# Patient Record
Sex: FEMALE | Race: WHITE | Employment: FULL TIME | URBAN - METROPOLITAN AREA
[De-identification: names, ages, dates, MRNs, and addresses within clinical notes are randomized per-mention and may not be internally consistent; named-entity substitution may affect disease eponyms.]

---

## 2017-01-10 RX ORDER — ATORVASTATIN CALCIUM 80 MG/1
80 TABLET, FILM COATED ORAL NIGHTLY
Qty: 90 TABLET | Refills: 1 | Status: SHIPPED | OUTPATIENT
Start: 2017-01-10 | End: 2017-03-29 | Stop reason: SDUPTHER

## 2017-01-10 RX ORDER — CLOPIDOGREL BISULFATE 75 MG/1
75 TABLET ORAL DAILY
Qty: 90 TABLET | Refills: 1 | Status: SHIPPED | OUTPATIENT
Start: 2017-01-10 | End: 2017-02-15 | Stop reason: SDUPTHER

## 2017-01-11 ENCOUNTER — TELEPHONE (OUTPATIENT)
Dept: CARDIOLOGY CLINIC | Age: 62
End: 2017-01-11

## 2017-01-24 ENCOUNTER — OFFICE VISIT (OUTPATIENT)
Dept: CARDIOLOGY CLINIC | Age: 62
End: 2017-01-24

## 2017-01-24 VITALS
SYSTOLIC BLOOD PRESSURE: 140 MMHG | DIASTOLIC BLOOD PRESSURE: 70 MMHG | WEIGHT: 177 LBS | HEART RATE: 49 BPM | HEIGHT: 64 IN | BODY MASS INDEX: 30.22 KG/M2 | OXYGEN SATURATION: 97 %

## 2017-01-24 DIAGNOSIS — Z86.711 HISTORY OF PULMONARY EMBOLISM: ICD-10-CM

## 2017-01-24 DIAGNOSIS — E78.2 MIXED HYPERLIPIDEMIA: ICD-10-CM

## 2017-01-24 DIAGNOSIS — I42.9 CARDIOMYOPATHY (HCC): ICD-10-CM

## 2017-01-24 DIAGNOSIS — R06.02 SOB (SHORTNESS OF BREATH): ICD-10-CM

## 2017-01-24 DIAGNOSIS — I25.10 CORONARY ARTERY DISEASE INVOLVING NATIVE CORONARY ARTERY OF NATIVE HEART WITHOUT ANGINA PECTORIS: Primary | ICD-10-CM

## 2017-01-24 DIAGNOSIS — I10 ESSENTIAL HYPERTENSION: ICD-10-CM

## 2017-01-24 DIAGNOSIS — I21.4 NSTEMI (NON-ST ELEVATED MYOCARDIAL INFARCTION) (HCC): ICD-10-CM

## 2017-01-24 PROCEDURE — 99214 OFFICE O/P EST MOD 30 MIN: CPT | Performed by: INTERNAL MEDICINE

## 2017-01-25 ENCOUNTER — HOSPITAL ENCOUNTER (OUTPATIENT)
Dept: OTHER | Age: 62
Discharge: HOME | End: 2017-01-25
Attending: INTERNAL MEDICINE | Admitting: INTERNAL MEDICINE

## 2017-01-25 LAB
CHOLESTEROL, TOTAL: 159 MG/DL (ref 0–199)
HDLC SERPL-MCNC: 78 MG/DL (ref 40–60)
LDL CHOLESTEROL CALCULATED: 57 MG/DL
TRIGL SERPL-MCNC: 120 MG/DL (ref 0–150)
VLDLC SERPL CALC-MCNC: 24 MG/DL

## 2017-01-26 ENCOUNTER — TELEPHONE (OUTPATIENT)
Dept: CARDIOLOGY CLINIC | Age: 62
End: 2017-01-26

## 2017-02-15 RX ORDER — CLOPIDOGREL BISULFATE 75 MG/1
75 TABLET ORAL DAILY
Qty: 90 TABLET | Refills: 3 | Status: SHIPPED | OUTPATIENT
Start: 2017-02-15 | End: 2017-12-05 | Stop reason: ALTCHOICE

## 2017-02-28 ENCOUNTER — TELEPHONE (OUTPATIENT)
Dept: CARDIOLOGY CLINIC | Age: 62
End: 2017-02-28

## 2017-02-28 ENCOUNTER — HOSPITAL ENCOUNTER (OUTPATIENT)
Dept: CARDIOLOGY | Facility: CLINIC | Age: 62
Discharge: HOME | End: 2017-02-28
Attending: INTERNAL MEDICINE | Admitting: INTERNAL MEDICINE

## 2017-03-02 PROBLEM — I50.20 SYSTOLIC CHF (HCC): Status: ACTIVE | Noted: 2017-03-02

## 2017-03-02 PROBLEM — R74.8 ELEVATED LIVER ENZYMES: Status: ACTIVE | Noted: 2017-03-02

## 2017-03-02 PROBLEM — R07.9 CHEST PAIN: Status: ACTIVE | Noted: 2017-03-02

## 2017-03-06 ENCOUNTER — TELEPHONE (OUTPATIENT)
Dept: CARDIOLOGY CLINIC | Age: 62
End: 2017-03-06

## 2017-03-06 DIAGNOSIS — E78.2 MIXED HYPERLIPIDEMIA: ICD-10-CM

## 2017-03-06 DIAGNOSIS — I25.10 CORONARY ARTERY DISEASE INVOLVING NATIVE CORONARY ARTERY OF NATIVE HEART WITHOUT ANGINA PECTORIS: Primary | ICD-10-CM

## 2017-03-06 DIAGNOSIS — I10 ESSENTIAL HYPERTENSION: ICD-10-CM

## 2017-03-06 DIAGNOSIS — I21.4 NSTEMI (NON-ST ELEVATED MYOCARDIAL INFARCTION) (HCC): ICD-10-CM

## 2017-03-08 RX ORDER — RANOLAZINE 500 MG/1
500 TABLET, EXTENDED RELEASE ORAL 2 TIMES DAILY
Qty: 180 TABLET | Refills: 3 | Status: SHIPPED | OUTPATIENT
Start: 2017-03-08 | End: 2018-01-22 | Stop reason: SDUPTHER

## 2017-03-14 ENCOUNTER — OFFICE VISIT (OUTPATIENT)
Dept: CARDIOLOGY CLINIC | Age: 62
End: 2017-03-14

## 2017-03-14 VITALS
WEIGHT: 174 LBS | BODY MASS INDEX: 30.83 KG/M2 | OXYGEN SATURATION: 97 % | HEIGHT: 63 IN | SYSTOLIC BLOOD PRESSURE: 138 MMHG | HEART RATE: 53 BPM | DIASTOLIC BLOOD PRESSURE: 70 MMHG

## 2017-03-14 DIAGNOSIS — E78.2 MIXED HYPERLIPIDEMIA: ICD-10-CM

## 2017-03-14 DIAGNOSIS — I25.10 CORONARY ARTERY DISEASE INVOLVING NATIVE CORONARY ARTERY OF NATIVE HEART WITHOUT ANGINA PECTORIS: Primary | ICD-10-CM

## 2017-03-14 DIAGNOSIS — I21.4 NSTEMI (NON-ST ELEVATED MYOCARDIAL INFARCTION) (HCC): ICD-10-CM

## 2017-03-14 DIAGNOSIS — I42.9 CARDIOMYOPATHY (HCC): ICD-10-CM

## 2017-03-14 DIAGNOSIS — I10 ESSENTIAL HYPERTENSION: ICD-10-CM

## 2017-03-14 PROCEDURE — 99214 OFFICE O/P EST MOD 30 MIN: CPT | Performed by: NURSE PRACTITIONER

## 2017-03-20 ENCOUNTER — TELEPHONE (OUTPATIENT)
Dept: CARDIOLOGY CLINIC | Age: 62
End: 2017-03-20

## 2017-03-29 RX ORDER — ATORVASTATIN CALCIUM 80 MG/1
TABLET, FILM COATED ORAL
Qty: 90 TABLET | Refills: 0 | Status: SHIPPED | OUTPATIENT
Start: 2017-03-29 | End: 2017-05-31 | Stop reason: SDUPTHER

## 2017-04-28 ENCOUNTER — HOSPITAL ENCOUNTER (OUTPATIENT)
Dept: OTHER | Age: 62
Discharge: HOME | End: 2017-04-28

## 2017-04-28 LAB
HCT VFR BLD CALC: 32.7 % (ref 36–48)
HEMOGLOBIN: 10.9 G/DL (ref 12–16)

## 2017-05-31 RX ORDER — ATORVASTATIN CALCIUM 80 MG/1
TABLET, FILM COATED ORAL
Qty: 90 TABLET | Refills: 1 | Status: SHIPPED | OUTPATIENT
Start: 2017-05-31 | End: 2018-04-21 | Stop reason: SDUPTHER

## 2017-06-02 LAB
BLOOD BANK COMMENT: NORMAL
SPECIMEN EXPIRATION: NORMAL

## 2017-06-03 LAB
ABO GROUPING: NORMAL
ANTIBODY SCREEN: NEGATIVE
BLOOD PRODUCT UNIT ID DOSE NUM: NORMAL
BLOOD PRODUCT UNIT ID DOSE NUM: NORMAL
COMPONENT: NORMAL
COMPONENT: NORMAL
CROSSMATCH RESULT: NORMAL
CROSSMATCH RESULT: NORMAL
SPECIMEN EXPIRATION: NORMAL
STATUS: NORMAL
STATUS: NORMAL
TRANSFUSION STATUS FIND PT: NORMAL
TRANSFUSION STATUS FIND PT: NORMAL
UNIT DIVISION: 0
UNIT DIVISION: 0

## 2017-06-27 ENCOUNTER — OFFICE VISIT (OUTPATIENT)
Dept: CARDIOLOGY CLINIC | Age: 62
End: 2017-06-27

## 2017-06-27 VITALS
HEIGHT: 63 IN | SYSTOLIC BLOOD PRESSURE: 82 MMHG | HEART RATE: 88 BPM | WEIGHT: 151 LBS | DIASTOLIC BLOOD PRESSURE: 56 MMHG | BODY MASS INDEX: 26.75 KG/M2

## 2017-06-27 DIAGNOSIS — I50.21 ACUTE SYSTOLIC CONGESTIVE HEART FAILURE (HCC): ICD-10-CM

## 2017-06-27 DIAGNOSIS — Z86.711 HISTORY OF PULMONARY EMBOLISM: ICD-10-CM

## 2017-06-27 DIAGNOSIS — E78.00 PURE HYPERCHOLESTEROLEMIA: ICD-10-CM

## 2017-06-27 DIAGNOSIS — I25.10 CORONARY ARTERY DISEASE INVOLVING NATIVE CORONARY ARTERY OF NATIVE HEART WITHOUT ANGINA PECTORIS: Primary | ICD-10-CM

## 2017-06-27 DIAGNOSIS — I50.20 SYSTOLIC CONGESTIVE HEART FAILURE, UNSPECIFIED CONGESTIVE HEART FAILURE CHRONICITY: ICD-10-CM

## 2017-06-27 DIAGNOSIS — I10 ESSENTIAL HYPERTENSION: ICD-10-CM

## 2017-06-27 DIAGNOSIS — I50.22 CHRONIC SYSTOLIC CONGESTIVE HEART FAILURE (HCC): ICD-10-CM

## 2017-06-27 PROCEDURE — 99214 OFFICE O/P EST MOD 30 MIN: CPT | Performed by: INTERNAL MEDICINE

## 2017-07-03 ENCOUNTER — HOSPITAL ENCOUNTER (OUTPATIENT)
Dept: MRI IMAGING | Age: 62
Discharge: HOME | End: 2017-07-03

## 2017-07-03 DIAGNOSIS — C48.2 MALIGNANT NEOPLASM OF PERITONEUM (HCC): ICD-10-CM

## 2017-07-03 DIAGNOSIS — C48.2 MALIGNANT NEOPLASM OF PERITONEUM, UNSPECIFIED (HCC): ICD-10-CM

## 2017-07-21 ENCOUNTER — HOSPITAL ENCOUNTER (OUTPATIENT)
Dept: CARDIOLOGY | Facility: CLINIC | Age: 62
Discharge: HOME | End: 2017-07-21
Attending: INTERNAL MEDICINE | Admitting: INTERNAL MEDICINE

## 2017-07-21 ENCOUNTER — TELEPHONE (OUTPATIENT)
Dept: CARDIOLOGY CLINIC | Age: 62
End: 2017-07-21

## 2017-08-30 ENCOUNTER — HOSPITAL ENCOUNTER (OUTPATIENT)
Dept: ONCOLOGY | Age: 62
Discharge: HOME | End: 2017-08-30

## 2017-08-30 DIAGNOSIS — C48.2 MALIGNANT NEOPLASM OF PERITONEUM (HCC): ICD-10-CM

## 2017-08-30 RX ORDER — HEPARIN SODIUM (PORCINE) LOCK FLUSH IV SOLN 100 UNIT/ML 100 UNIT/ML
500 SOLUTION INTRAVENOUS PRN
Status: DISCONTINUED | OUTPATIENT
Start: 2017-08-30 | End: 2017-08-31 | Stop reason: HOSPADM

## 2017-08-30 RX ADMIN — HEPARIN SODIUM (PORCINE) LOCK FLUSH IV SOLN 100 UNIT/ML 500 UNITS: 100 SOLUTION at 07:31

## 2017-09-11 ENCOUNTER — HOSPITAL ENCOUNTER (OUTPATIENT)
Dept: CT IMAGING | Age: 62
Discharge: HOME | End: 2017-09-11
Attending: INTERNAL MEDICINE | Admitting: INTERNAL MEDICINE

## 2017-09-11 DIAGNOSIS — C48.2 MALIGNANT NEOPLASM OF PERITONEUM (HCC): ICD-10-CM

## 2017-09-11 DIAGNOSIS — C48.2 MALIGNANT NEOPLASM OF PERITONEUM, UNSPECIFIED (HCC): ICD-10-CM

## 2017-11-03 ENCOUNTER — TELEPHONE (OUTPATIENT)
Dept: CARDIOLOGY CLINIC | Age: 62
End: 2017-11-03

## 2017-11-03 RX ORDER — FUROSEMIDE 20 MG/1
20 TABLET ORAL DAILY PRN
Qty: 30 TABLET | Refills: 1 | Status: SHIPPED | OUTPATIENT
Start: 2017-11-03 | End: 2018-02-20 | Stop reason: SDUPTHER

## 2017-11-03 NOTE — TELEPHONE ENCOUNTER
Anemia can cause shortness of breath. Transfusion can lead to fluid retention since receiving additional fluids/ volume with this. Let's give her Lasix 20 mg to use only as needed for leg swelling. She should follow up with oncologist regarding anemia. I will send in RX. Keep appointment next month with Lyndal Rubinstein as scheduled.    Thanks, [employer]

## 2017-11-16 ENCOUNTER — HOSPITAL ENCOUNTER (OUTPATIENT)
Dept: CT IMAGING | Age: 62
Discharge: HOME | End: 2017-11-16
Attending: INTERNAL MEDICINE | Admitting: INTERNAL MEDICINE

## 2017-11-16 DIAGNOSIS — C48.2 MALIGNANT NEOPLASM OF PERITONEUM (HCC): ICD-10-CM

## 2017-11-16 DIAGNOSIS — C48.2 MALIGNANT NEOPLASM OF ILL-DEFINED SITES OF DIGESTIVE ORGANS AND PERITONEUM (HCC): ICD-10-CM

## 2017-11-16 DIAGNOSIS — C26.9 MALIGNANT NEOPLASM OF ILL-DEFINED SITES OF DIGESTIVE ORGANS AND PERITONEUM (HCC): ICD-10-CM

## 2017-12-05 ENCOUNTER — OFFICE VISIT (OUTPATIENT)
Dept: CARDIOLOGY CLINIC | Age: 62
End: 2017-12-05

## 2017-12-05 VITALS
BODY MASS INDEX: 25.52 KG/M2 | DIASTOLIC BLOOD PRESSURE: 58 MMHG | OXYGEN SATURATION: 96 % | WEIGHT: 144 LBS | SYSTOLIC BLOOD PRESSURE: 96 MMHG | HEIGHT: 63 IN | HEART RATE: 68 BPM

## 2017-12-05 DIAGNOSIS — I50.22 CHRONIC SYSTOLIC CONGESTIVE HEART FAILURE (HCC): ICD-10-CM

## 2017-12-05 DIAGNOSIS — Z86.711 HISTORY OF PULMONARY EMBOLISM: ICD-10-CM

## 2017-12-05 DIAGNOSIS — D64.9 ANEMIA, UNSPECIFIED TYPE: ICD-10-CM

## 2017-12-05 DIAGNOSIS — I25.10 CORONARY ARTERY DISEASE INVOLVING NATIVE CORONARY ARTERY OF NATIVE HEART WITHOUT ANGINA PECTORIS: Primary | ICD-10-CM

## 2017-12-05 PROCEDURE — 1036F TOBACCO NON-USER: CPT | Performed by: INTERNAL MEDICINE

## 2017-12-05 PROCEDURE — G8484 FLU IMMUNIZE NO ADMIN: HCPCS | Performed by: INTERNAL MEDICINE

## 2017-12-05 PROCEDURE — G8598 ASA/ANTIPLAT THER USED: HCPCS | Performed by: INTERNAL MEDICINE

## 2017-12-05 PROCEDURE — G8427 DOCREV CUR MEDS BY ELIG CLIN: HCPCS | Performed by: INTERNAL MEDICINE

## 2017-12-05 PROCEDURE — 3017F COLORECTAL CA SCREEN DOC REV: CPT | Performed by: INTERNAL MEDICINE

## 2017-12-05 PROCEDURE — 3014F SCREEN MAMMO DOC REV: CPT | Performed by: INTERNAL MEDICINE

## 2017-12-05 PROCEDURE — 99214 OFFICE O/P EST MOD 30 MIN: CPT | Performed by: INTERNAL MEDICINE

## 2017-12-05 PROCEDURE — G8417 CALC BMI ABV UP PARAM F/U: HCPCS | Performed by: INTERNAL MEDICINE

## 2017-12-05 NOTE — PROGRESS NOTES
[address] [facility]   Cardiovascular Evaluation    PATIENT: Alf Henderson  DATE: 2017  MRN: [id number]  CSN: [id number]  : 1955      Primary Care Doctor: Elton Lal  Reason for evaluation:   6 Month Follow-Up and Coronary Artery Disease      Subjective:   History of present illness on initial date of evaluation:   Alf Henderson is a 58 y.o. patient who presents for follow up. the hospital with complaints of CP. Pt was in kitchen and note sudden onset severe chest pressure that radiated to left shoulder. Severe diaphoresis and some SOB. Nothing made better or worse, came to ED and 1st ECG with suspicious ST changes, repeat ECG with similar changes. Her urgent cardiac cath lab on 16 showed 100% distal LAD with plaque rupture and treated successfully with bare metal stent   Today she states she has been feeling well overall. She was in the hospital for anemia. She states she has had dark stools. HBG was 7.1 on 17. She denies chest pain, SOB, dizziness, or syncope.        Patient Active Problem List   Diagnosis    Cancer of peritoneum ([address]    Pulmonary embolism on right ([address]    MALIGNANT ASCITES    History of ileus    Anemia    Hypertension    Drug-induced neutropenia (HCC)    Colon stricture    Colonic endometriosis    Abdominal dropsy    Metastatic cancer to pelvis ([address]    Chemotherapy-induced neuropathy ([address]    Cancer related pain    Encounter for anticoagulation discussion and counseling    Encounter for antineoplastic chemotherapy    Acute gastric volvulus    Metastasis to supraclavicular lymph node (HCC)    Metastasis to retroperitoneal lymph node (HCC)    Metastasis to liver (HCC)    Abnormal ECG    Acute systolic congestive heart failure (HCC)    Coronary artery disease involving native coronary artery of native heart without angina pectoris    NSTEMI (non-ST elevated myocardial infarction) ([address]    Pulmonary embolism ([address]    Hyperlipidemia    Chest pain    Elevated liver enzymes    Systolic CHF (HCC)    History of pulmonary embolism         Past Medical History:   has a past medical history of Anemia; Arthritis; CAD (coronary artery disease); Cancer ([address] Cancer related pain; Chemotherapy-induced neuropathy ([address] CHF (congestive heart failure) ([address] History of endometriosis; History of ileus; HTN (hypertension); Hx of blood clots; Hyperlipidemia; and Pulmonary embolism ([address] Surgical History:   has a past surgical history that includes fine needle aspiration (Right, 1999); carroll and bso (cervix removed) (2002); Vaginal prolapse repair; [place] tooth extraction; Abdominal exploration surgery (4/24/2014); Hysterectomy; Abdominal exploration surgery (N/A, 4/23/2015); Abdominal exploration surgery (2/19/2016); and Coronary angioplasty with stent (11/27/2016). Social History:   reports that she has never smoked. She has never used smokeless tobacco. She reports that she drinks alcohol. She reports that she does not use drugs. Family History:  No evidence for sudden cardiac death or premature CAD    Home Medications:  Reviewed and are listed in nursing record. and/or listed below  Current Outpatient Prescriptions   Medication Sig Dispense Refill    Cyanocobalamin (VITAMIN B12 PO) Take by mouth      furosemide (LASIX) 20 MG tablet Take 1 tablet by mouth daily as needed (swelling) 30 tablet 1    folic acid (FOLVITE) 1 MG tablet Take 1 tablet by mouth  daily 90 tablet 0    metoprolol tartrate (LOPRESSOR) 25 MG tablet Take 1 tablet by mouth two  times daily 180 tablet 1    atorvastatin (LIPITOR) 80 MG tablet Take 1 tablet by mouth  nightly 90 tablet 1    nitroGLYCERIN (NITROSTAT) 0.4 MG SL tablet up to max of 3 total doses.  If no relief after 1 dose, call 911. 25 tablet 3    diphenoxylate-atropine (LOMOTIL) 2.5-0.025 MG per tablet Take 1 tablet by mouth 4 times daily as needed for Diarrhea      Magic Mouthwash (MIRACLE fraction estimated at 55%. No wall motion abnormalities. Diastolic filling parameters suggests grade I diastolic dysfunction . Aortic valve leaflets are not entirely well visualized but appear mildly calcified. Mild-moderate aortic regurgitation. Mild tricuspid regurgitation. Systolic pulmonary artery pressure (SPAP) is normal and estimated at 22mmHg   (RA pressure 3mmHg). CARDIAC CATH: 11/27/16  PCI of distal LAD  Stent: mini vision 2 x 12mm  Post dilated with 2 x6mm    1. Successful PCi to distal LAD using a small bare metal stent. 100%-->0% MARAH-3 flow. Some residual clot that formed that improved with repeat balloon inflation. Given CP-0 and MARAH-3 flow, will place on integrillin as tolerated to attack clot    CATH 3/2017  ANGIOGRAPHIC FINDINGS:  1. Left main coronary comes from left coronary cusp. It trifurcates to an  LAD, ramus, and a circ. 2.  LAD is a large sized vessel that runs in the interventricular groove and  it wraps the apex distally to the posterior wall. The LAD is very tortuous  throughout its course. In the distal LAD was a previously placed stent that  appeared to be 100% occluded. There was evidence of possible thrombus  proximal to this stent. There was also some endothelial dysfunction and dye  hangout seen in the mid aspect of the LAD. There were faint left-to-left  collaterals for the distal LAD, mostly in the posterior section. 3.  The ramus was a small vessel with no angiographic disease. 4.  The circumflex was dominant for posterior circulation. It had luminals  throughout the proximal section. The rest of the vessel had no significant  angiographic evidence of disease, breaking off to several obtuse marginal  vessels. 5.  The right coronary artery was nondominant, but it was moderate in size. It had no significant disease.   6.  LVEDP was 6.  7.  Left ventricular ejection fraction appeared to be very vigorous at 60%  with no evidence of abnormal wall motion.     Assessment:  1. Unsuccessful attempt to open a distal LAD despite multiple wires and  crossing techniques. I felt at this time the patient likely had in-stent  restenosis that was severe and then had thrombosed on top of that. The  residual stenosis is preventing any wire and the tortuosity is preventing any  pushability. VASCULAR/OTHER IMAGING:      Assessment and Plan   Luisa Bourne is a 58 y.o. female who presents today for the following problems:      1. CAD: presented as STEMI. S/p PCi to distal LAD using bare metal 11/2015   - stent thrombosis 3/2017- unable to open and left for collaterals    2. Chronic systolic cardiomyopathy: LVEF 50%. mild hypokinesis of the apical inferior, apex, and basal inferior wall. - euvoelmic and compensated, no issues    3. Hx of PE: lifelong anticoagulation per Hem/onc   - isolated and found by chance 12/2014. + Cancer hx    4. Primary peritoneal carcinomatosis and mets to shoulder: diagnosed in 12/2014 and currently under care of Dr. Koko Arriola. Has had platinum refractory disease and undergone many chemo regimens unsuccessfully. Currently finished \"expiremental study drug\" at [address] Hx of palliative radiation for mets. - cancer growing, tryin new chemo that lowers CBC    5. Anemia- hem/onc following   - given closed stent, will change plavix to asa 81mg day, d/c if life threatening bleeding   - xarelto per Hem/onc      MD PLAN  1. Limited echo in 1 month  2. Cont xarelto and plavix but HOLD ASA for triple therapy  3. Update lipids  4. Cont lipitor, lopressor   5. Wean off ranexa       Plan:  1. Stop Plavix  2. Start Aspirin 81 mg daily  3. Follow up with me in 6 months     QUALITY MEASURES  1. Tobacco Cessation Counseling: NA  2. Retake of BP if >140/90:   NA  3. Documentation to PCP/referring for new patient:  Sent to PCP at close of office visit  4. CAD patient on anti-platelet: Yes  5. CAD patient on STATIN therapy:  Yes  6.

## 2017-12-05 NOTE — COMMUNICATION BODY
Assessment and Plan   Maria L Ham is a 58 y.o. female who presents today for the following problems:       1. CAD: presented as STEMI. S/p PCi to distal LAD using bare metal 11/2015              - stent thrombosis 3/2017- unable to open and left for collaterals     2. Chronic systolic cardiomyopathy: LVEF 50%. mild hypokinesis of the apical inferior, apex, and basal inferior wall. - euvoelmic and compensated, no issues     3. Hx of PE: lifelong anticoagulation per Hem/onc              - isolated and found by chance 12/2014. + Cancer hx     4. Primary peritoneal carcinomatosis and mets to shoulder: diagnosed in 12/2014 and currently under care of Dr. Gabriel Pichardo. Has had platinum refractory disease and undergone many chemo regimens unsuccessfully. Currently finished \"expiremental study drug\" at [address] Hx of palliative radiation for mets. - cancer growing, tryin new chemo that lowers CBC     5. Anemia- hem/onc following              - given closed stent, will change plavix to asa 81mg day, d/c if life threatening bleeding              - xarelto per Hem/onc        MD PLAN  1. Limited echo in 1 month  2. Cont xarelto and plavix but HOLD ASA for triple therapy  3. Update lipids  4. Cont lipitor, lopressor   5.  Wean off ranexa

## 2018-01-23 RX ORDER — RANOLAZINE 500 MG/1
TABLET, FILM COATED, EXTENDED RELEASE ORAL
Qty: 180 TABLET | Refills: 3 | Status: SHIPPED | OUTPATIENT
Start: 2018-01-23 | End: 2018-06-12 | Stop reason: ALTCHOICE

## 2018-02-20 RX ORDER — FUROSEMIDE 20 MG/1
20 TABLET ORAL DAILY PRN
Qty: 90 TABLET | Refills: 1 | Status: SHIPPED | OUTPATIENT
Start: 2018-02-20 | End: 2018-07-18 | Stop reason: SDUPTHER

## 2018-02-20 NOTE — TELEPHONE ENCOUNTER
ISSAC Pt  Last office visit 12/5/2017, return in 6 months  BMP 3/4/2017  Lipid 3/3/2017  ekg 3/3/2017

## 2018-02-23 ENCOUNTER — HOSPITAL ENCOUNTER (OUTPATIENT)
Dept: CT IMAGING | Age: 63
Discharge: HOME | End: 2018-02-23
Attending: INTERNAL MEDICINE | Admitting: INTERNAL MEDICINE

## 2018-02-23 DIAGNOSIS — C48.2 PRIMARY CANCER OF PERITONEUM (HCC): ICD-10-CM

## 2018-02-23 DIAGNOSIS — C48.2 MALIGNANT NEOPLASM OF PERITONEUM (HCC): ICD-10-CM

## 2018-03-17 ENCOUNTER — HOSPITAL ENCOUNTER (OUTPATIENT)
Dept: ONCOLOGY | Age: 63
Discharge: HOME | End: 2018-03-31
Attending: INTERNAL MEDICINE | Admitting: INTERNAL MEDICINE

## 2018-03-17 ENCOUNTER — HOSPITAL ENCOUNTER (OUTPATIENT)
Dept: ONCOLOGY | Age: 63
Discharge: HOME | End: 2018-03-17
Attending: INTERNAL MEDICINE | Admitting: INTERNAL MEDICINE

## 2018-03-17 VITALS
HEART RATE: 73 BPM | TEMPERATURE: 98.1 F | DIASTOLIC BLOOD PRESSURE: 59 MMHG | RESPIRATION RATE: 16 BRPM | SYSTOLIC BLOOD PRESSURE: 101 MMHG

## 2018-03-17 LAB
ABO/RH: NORMAL
ANTIBODY SCREEN: NORMAL

## 2018-03-17 RX ORDER — 0.9 % SODIUM CHLORIDE 0.9 %
500 INTRAVENOUS SOLUTION INTRAVENOUS ONCE
Status: COMPLETED | OUTPATIENT
Start: 2018-03-17 | End: 2018-03-17

## 2018-03-17 RX ORDER — HEPARIN SODIUM (PORCINE) LOCK FLUSH IV SOLN 100 UNIT/ML 100 UNIT/ML
300 SOLUTION INTRAVENOUS PRN
Status: DISCONTINUED | OUTPATIENT
Start: 2018-03-17 | End: 2018-03-19 | Stop reason: HOSPADM

## 2018-03-17 RX ADMIN — Medication 500 ML: at 12:00

## 2018-03-17 RX ADMIN — HEPARIN SODIUM (PORCINE) LOCK FLUSH IV SOLN 100 UNIT/ML 300 UNITS: 100 SOLUTION at 14:38

## 2018-03-17 NOTE — PLAN OF CARE
Problem: KNOWLEDGE DEFICIT  Goal: Patient/S.O. demonstrates understanding of disease process, treatment plan, medications, and discharge instructions. Outcome: Met This Shift  Patient seen and assessed at [facility]. Seen in OP Infusion for one unit prbc's for a hgb less than 7. Tolerated well. To return in AM for second unit prbc's per orders. Post transfusion d/c instructions reviewed. Verbalized understanding. D/C'd ambulatory.

## 2018-03-18 ENCOUNTER — HOSPITAL ENCOUNTER (OUTPATIENT)
Dept: ONCOLOGY | Age: 63
Discharge: HOME | End: 2018-03-19
Attending: INTERNAL MEDICINE | Admitting: INTERNAL MEDICINE

## 2018-03-18 VITALS
HEART RATE: 78 BPM | DIASTOLIC BLOOD PRESSURE: 57 MMHG | SYSTOLIC BLOOD PRESSURE: 103 MMHG | RESPIRATION RATE: 18 BRPM | TEMPERATURE: 98.8 F

## 2018-03-18 LAB
BLOOD BANK DISPENSE STATUS: NORMAL
BLOOD BANK DISPENSE STATUS: NORMAL
BLOOD BANK PRODUCT CODE: NORMAL
BLOOD BANK PRODUCT CODE: NORMAL
BPU ID: NORMAL
BPU ID: NORMAL
DESCRIPTION BLOOD BANK: NORMAL
DESCRIPTION BLOOD BANK: NORMAL

## 2018-03-18 RX ORDER — HEPARIN SODIUM (PORCINE) LOCK FLUSH IV SOLN 100 UNIT/ML 100 UNIT/ML
300 SOLUTION INTRAVENOUS PRN
Status: DISCONTINUED | OUTPATIENT
Start: 2018-03-18 | End: 2018-03-20 | Stop reason: HOSPADM

## 2018-03-18 NOTE — PLAN OF CARE
Problem: KNOWLEDGE DEFICIT  Goal: Patient/S.O. demonstrates understanding of disease process, treatment plan, medications, and discharge instructions. Outcome: Met This Shift  Patient received second unit prbcs for hgb less than 7 on 3/17/18 which she tolerated well. Seen by WILLIE Powell. Verbalized understanding of d/c instructions. D/C'd ambulatory to home.

## 2018-04-01 ENCOUNTER — HOSPITAL ENCOUNTER (OUTPATIENT)
Dept: ONCOLOGY | Age: 63
Discharge: HOME | End: 2018-04-30
Attending: INTERNAL MEDICINE | Admitting: INTERNAL MEDICINE

## 2018-04-23 RX ORDER — ATORVASTATIN CALCIUM 80 MG/1
TABLET, FILM COATED ORAL
Qty: 90 TABLET | Refills: 0 | Status: SHIPPED | OUTPATIENT
Start: 2018-04-23 | End: 2018-07-12 | Stop reason: SDUPTHER

## 2018-05-17 ENCOUNTER — HOSPITAL ENCOUNTER (OUTPATIENT)
Dept: CT IMAGING | Age: 63
Discharge: HOME | End: 2018-05-17
Attending: INTERNAL MEDICINE | Admitting: INTERNAL MEDICINE

## 2018-05-17 DIAGNOSIS — C48.2 MALIGNANT NEOPLASM OF ILL-DEFINED SITES OF DIGESTIVE ORGANS AND PERITONEUM (HCC): ICD-10-CM

## 2018-05-17 DIAGNOSIS — C48.2 MALIGNANT NEOPLASM OF PERITONEUM (HCC): ICD-10-CM

## 2018-05-17 DIAGNOSIS — C26.9 MALIGNANT NEOPLASM OF ILL-DEFINED SITES OF DIGESTIVE ORGANS AND PERITONEUM (HCC): ICD-10-CM

## 2018-06-12 ENCOUNTER — OFFICE VISIT (OUTPATIENT)
Dept: CARDIOLOGY CLINIC | Age: 63
End: 2018-06-12

## 2018-06-12 VITALS
DIASTOLIC BLOOD PRESSURE: 63 MMHG | WEIGHT: 137 LBS | OXYGEN SATURATION: 97 % | HEIGHT: 63 IN | BODY MASS INDEX: 24.27 KG/M2 | HEART RATE: 58 BPM | SYSTOLIC BLOOD PRESSURE: 98 MMHG

## 2018-06-12 DIAGNOSIS — I25.5 ISCHEMIC CARDIOMYOPATHY: ICD-10-CM

## 2018-06-12 DIAGNOSIS — I25.10 CORONARY ARTERY DISEASE INVOLVING NATIVE CORONARY ARTERY OF NATIVE HEART WITHOUT ANGINA PECTORIS: Primary | ICD-10-CM

## 2018-06-12 DIAGNOSIS — D64.89 ANEMIA DUE TO OTHER CAUSE, NOT CLASSIFIED: ICD-10-CM

## 2018-06-12 PROCEDURE — G8420 CALC BMI NORM PARAMETERS: HCPCS | Performed by: INTERNAL MEDICINE

## 2018-06-12 PROCEDURE — G8427 DOCREV CUR MEDS BY ELIG CLIN: HCPCS | Performed by: INTERNAL MEDICINE

## 2018-06-12 PROCEDURE — 99213 OFFICE O/P EST LOW 20 MIN: CPT | Performed by: INTERNAL MEDICINE

## 2018-06-12 PROCEDURE — 3017F COLORECTAL CA SCREEN DOC REV: CPT | Performed by: INTERNAL MEDICINE

## 2018-06-12 PROCEDURE — G8598 ASA/ANTIPLAT THER USED: HCPCS | Performed by: INTERNAL MEDICINE

## 2018-06-12 PROCEDURE — 1036F TOBACCO NON-USER: CPT | Performed by: INTERNAL MEDICINE

## 2018-06-12 RX ORDER — ASPIRIN 81 MG/1
81 TABLET, CHEWABLE ORAL PRN
COMMUNITY

## 2018-07-13 RX ORDER — ATORVASTATIN CALCIUM 80 MG/1
TABLET, FILM COATED ORAL
Qty: 90 TABLET | Refills: 3 | Status: ON HOLD | OUTPATIENT
Start: 2018-07-13 | End: 2018-12-13 | Stop reason: HOSPADM

## 2018-07-19 ENCOUNTER — HOSPITAL ENCOUNTER (OUTPATIENT)
Dept: CT IMAGING | Age: 63
Discharge: HOME | End: 2018-07-19
Payer: COMMERCIAL

## 2018-07-19 DIAGNOSIS — C48.2 MALIGNANT NEOPLASM OF PERITONEUM (HCC): ICD-10-CM

## 2018-07-19 PROCEDURE — 71260 CT THORAX DX C+: CPT

## 2018-07-19 PROCEDURE — 6360000004 HC RX CONTRAST MEDICATION: Performed by: INTERNAL MEDICINE

## 2018-07-19 PROCEDURE — 74177 CT ABD & PELVIS W/CONTRAST: CPT

## 2018-07-19 RX ORDER — FUROSEMIDE 20 MG/1
TABLET ORAL
Qty: 90 TABLET | Refills: 3 | Status: SHIPPED | OUTPATIENT
Start: 2018-07-19

## 2018-07-19 RX ADMIN — IOHEXOL 50 ML: 240 INJECTION, SOLUTION INTRATHECAL; INTRAVASCULAR; INTRAVENOUS; ORAL at 10:30

## 2018-07-19 RX ADMIN — IOPAMIDOL 100 ML: 755 INJECTION, SOLUTION INTRAVENOUS at 10:30

## 2018-10-05 ENCOUNTER — HOSPITAL ENCOUNTER (OUTPATIENT)
Dept: CT IMAGING | Age: 63
Discharge: HOME | End: 2018-10-05
Payer: COMMERCIAL

## 2018-10-05 DIAGNOSIS — C48.2 MALIGNANT NEOPLASM OF PERITONEUM, UNSPECIFIED (HCC): ICD-10-CM

## 2018-10-05 PROCEDURE — 71260 CT THORAX DX C+: CPT

## 2018-10-05 PROCEDURE — 74177 CT ABD & PELVIS W/CONTRAST: CPT

## 2018-10-05 PROCEDURE — 6360000004 HC RX CONTRAST MEDICATION: Performed by: INTERNAL MEDICINE

## 2018-10-05 RX ADMIN — IOHEXOL 50 ML: 240 INJECTION, SOLUTION INTRATHECAL; INTRAVASCULAR; INTRAVENOUS; ORAL at 18:54

## 2018-10-05 RX ADMIN — IOPAMIDOL 100 ML: 755 INJECTION, SOLUTION INTRAVENOUS at 18:53

## 2018-11-12 ENCOUNTER — HOSPITAL ENCOUNTER (EMERGENCY)
Age: 63
Discharge: HOME | End: 2018-11-13
Attending: EMERGENCY MEDICINE
Payer: COMMERCIAL

## 2018-11-12 ENCOUNTER — APPOINTMENT (OUTPATIENT)
Dept: CT IMAGING | Age: 63
End: 2018-11-12
Payer: COMMERCIAL

## 2018-11-12 ENCOUNTER — APPOINTMENT (OUTPATIENT)
Dept: GENERAL RADIOLOGY | Age: 63
End: 2018-11-12
Payer: COMMERCIAL

## 2018-11-12 DIAGNOSIS — R45.1 AGITATION: Primary | ICD-10-CM

## 2018-11-12 LAB
A/G RATIO: 0.7 (ref 1.1–2.2)
ACETAMINOPHEN LEVEL: <5 UG/ML (ref 10–30)
ALBUMIN SERPL-MCNC: 3.2 G/DL (ref 3.4–5)
ALP BLD-CCNC: 171 U/L (ref 40–129)
ALT SERPL-CCNC: 7 U/L (ref 10–40)
ANION GAP SERPL CALCULATED.3IONS-SCNC: 17 MMOL/L (ref 3–16)
ANISOCYTOSIS: ABNORMAL
AST SERPL-CCNC: 18 U/L (ref 15–37)
BANDED NEUTROPHILS RELATIVE PERCENT: 9 % (ref 0–7)
BASOPHILS ABSOLUTE: 0 K/UL (ref 0–0.2)
BASOPHILS RELATIVE PERCENT: 0 %
BILIRUB SERPL-MCNC: 0.4 MG/DL (ref 0–1)
BUN BLDV-MCNC: 19 MG/DL (ref 7–20)
BURR CELLS: ABNORMAL
CALCIUM SERPL-MCNC: 9.4 MG/DL (ref 8.3–10.6)
CHLORIDE BLD-SCNC: 93 MMOL/L (ref 99–110)
CO2: 26 MMOL/L (ref 21–32)
CREAT SERPL-MCNC: 1 MG/DL (ref 0.6–1.2)
EOSINOPHILS ABSOLUTE: 0 K/UL (ref 0–0.6)
EOSINOPHILS RELATIVE PERCENT: 0 %
GFR AFRICAN AMERICAN: >60
GFR NON-AFRICAN AMERICAN: 56
GLOBULIN: 4.4 G/DL
GLUCOSE BLD-MCNC: 120 MG/DL (ref 70–99)
HCT VFR BLD CALC: 23.2 % (ref 36–48)
HEMOGLOBIN: 8.1 G/DL (ref 12–16)
HOWELL-JOLLY BODIES: ABNORMAL
HYPOCHROMIA: ABNORMAL
LYMPHOCYTES ABSOLUTE: 0.8 K/UL (ref 1–5.1)
LYMPHOCYTES RELATIVE PERCENT: 18 %
MACROCYTES: ABNORMAL
MAGNESIUM: 1.6 MG/DL (ref 1.8–2.4)
MCH RBC QN AUTO: 37.1 PG (ref 26–34)
MCHC RBC AUTO-ENTMCNC: 34.8 G/DL (ref 31–36)
MCV RBC AUTO: 106.5 FL (ref 80–100)
MICROCYTES: ABNORMAL
MONOCYTES ABSOLUTE: 0.5 K/UL (ref 0–1.3)
MONOCYTES RELATIVE PERCENT: 10 %
NEUTROPHILS ABSOLUTE: 3.4 K/UL (ref 1.7–7.7)
NEUTROPHILS RELATIVE PERCENT: 63 %
NUCLEATED RED BLOOD CELLS: 2 /100 WBC
OVALOCYTES: ABNORMAL
PDW BLD-RTO: 22.6 % (ref 12.4–15.4)
PLATELET # BLD: 425 K/UL (ref 135–450)
PMV BLD AUTO: 11 FL (ref 5–10.5)
POTASSIUM SERPL-SCNC: 3.2 MMOL/L (ref 3.5–5.1)
RBC # BLD: 2.18 M/UL (ref 4–5.2)
SALICYLATE, SERUM: <0.3 MG/DL (ref 15–30)
SODIUM BLD-SCNC: 136 MMOL/L (ref 136–145)
SPHEROCYTES: ABNORMAL
TOTAL CK: 57 U/L (ref 26–192)
TOTAL PROTEIN: 7.6 G/DL (ref 6.4–8.2)
TROPONIN: <0.01 NG/ML
WBC # BLD: 4.7 K/UL (ref 4–11)

## 2018-11-12 PROCEDURE — 6360000002 HC RX W HCPCS: Performed by: PHYSICIAN ASSISTANT

## 2018-11-12 PROCEDURE — 84484 ASSAY OF TROPONIN QUANT: CPT

## 2018-11-12 PROCEDURE — 2580000003 HC RX 258: Performed by: PHYSICIAN ASSISTANT

## 2018-11-12 PROCEDURE — G0480 DRUG TEST DEF 1-7 CLASSES: HCPCS

## 2018-11-12 PROCEDURE — 83735 ASSAY OF MAGNESIUM: CPT

## 2018-11-12 PROCEDURE — 96375 TX/PRO/DX INJ NEW DRUG ADDON: CPT

## 2018-11-12 PROCEDURE — 96374 THER/PROPH/DIAG INJ IV PUSH: CPT

## 2018-11-12 PROCEDURE — 99284 EMERGENCY DEPT VISIT MOD MDM: CPT

## 2018-11-12 PROCEDURE — 96361 HYDRATE IV INFUSION ADD-ON: CPT

## 2018-11-12 PROCEDURE — 82550 ASSAY OF CK (CPK): CPT

## 2018-11-12 PROCEDURE — 71045 X-RAY EXAM CHEST 1 VIEW: CPT

## 2018-11-12 PROCEDURE — 80053 COMPREHEN METABOLIC PANEL: CPT

## 2018-11-12 PROCEDURE — 85025 COMPLETE CBC W/AUTO DIFF WBC: CPT

## 2018-11-12 RX ORDER — SODIUM CHLORIDE 0.9 % (FLUSH) 0.9 %
SYRINGE (ML) INJECTION
Status: DISCONTINUED
Start: 2018-11-12 | End: 2018-11-13 | Stop reason: HOSPADM

## 2018-11-12 RX ORDER — ONDANSETRON 2 MG/ML
4 INJECTION INTRAMUSCULAR; INTRAVENOUS ONCE
Status: COMPLETED | OUTPATIENT
Start: 2018-11-12 | End: 2018-11-12

## 2018-11-12 RX ORDER — ZIPRASIDONE MESYLATE 20 MG/ML
10 INJECTION, POWDER, LYOPHILIZED, FOR SOLUTION INTRAMUSCULAR ONCE
Status: COMPLETED | OUTPATIENT
Start: 2018-11-13 | End: 2018-11-13

## 2018-11-12 RX ORDER — 0.9 % SODIUM CHLORIDE 0.9 %
1000 INTRAVENOUS SOLUTION INTRAVENOUS ONCE
Status: COMPLETED | OUTPATIENT
Start: 2018-11-12 | End: 2018-11-13

## 2018-11-12 RX ORDER — LORAZEPAM 2 MG/ML
1 INJECTION INTRAMUSCULAR ONCE
Status: COMPLETED | OUTPATIENT
Start: 2018-11-12 | End: 2018-11-12

## 2018-11-12 RX ADMIN — ONDANSETRON 4 MG: 2 INJECTION INTRAMUSCULAR; INTRAVENOUS at 23:23

## 2018-11-12 RX ADMIN — SODIUM CHLORIDE 1000 ML: 9 INJECTION, SOLUTION INTRAVENOUS at 23:25

## 2018-11-12 RX ADMIN — LORAZEPAM 1 MG: 2 INJECTION, SOLUTION INTRAMUSCULAR; INTRAVENOUS at 23:23

## 2018-11-13 ENCOUNTER — APPOINTMENT (OUTPATIENT)
Dept: CT IMAGING | Age: 63
End: 2018-11-13
Payer: COMMERCIAL

## 2018-11-13 VITALS
HEIGHT: 65 IN | OXYGEN SATURATION: 96 % | HEART RATE: 76 BPM | TEMPERATURE: 98 F | BODY MASS INDEX: 18.33 KG/M2 | SYSTOLIC BLOOD PRESSURE: 92 MMHG | RESPIRATION RATE: 18 BRPM | DIASTOLIC BLOOD PRESSURE: 57 MMHG | WEIGHT: 110 LBS

## 2018-11-13 LAB
AMORPHOUS: ABNORMAL /HPF
BACTERIA: ABNORMAL /HPF
BILIRUBIN URINE: NEGATIVE
BLOOD, URINE: NEGATIVE
CLARITY: ABNORMAL
COLOR: YELLOW
COMMENT UA: ABNORMAL
GLUCOSE URINE: NEGATIVE MG/DL
KETONES, URINE: ABNORMAL MG/DL
LEUKOCYTE ESTERASE, URINE: NEGATIVE
MICROSCOPIC EXAMINATION: YES
NITRITE, URINE: NEGATIVE
OCCULT BLOOD SCREENING: ABNORMAL
PH UA: 5.5
PROTEIN UA: 100 MG/DL
RBC UA: ABNORMAL /HPF (ref 0–2)
SPECIFIC GRAVITY UA: 1.02
URINE TYPE: ABNORMAL
UROBILINOGEN, URINE: 0.2 E.U./DL
WBC UA: ABNORMAL /HPF (ref 0–5)

## 2018-11-13 PROCEDURE — 96361 HYDRATE IV INFUSION ADD-ON: CPT

## 2018-11-13 PROCEDURE — 81001 URINALYSIS AUTO W/SCOPE: CPT

## 2018-11-13 PROCEDURE — 6360000002 HC RX W HCPCS: Performed by: PHYSICIAN ASSISTANT

## 2018-11-13 PROCEDURE — G0328 FECAL BLOOD SCRN IMMUNOASSAY: HCPCS

## 2018-11-13 PROCEDURE — 70450 CT HEAD/BRAIN W/O DYE: CPT

## 2018-11-13 PROCEDURE — 96372 THER/PROPH/DIAG INJ SC/IM: CPT

## 2018-11-13 PROCEDURE — 6370000000 HC RX 637 (ALT 250 FOR IP): Performed by: EMERGENCY MEDICINE

## 2018-11-13 RX ORDER — POTASSIUM CHLORIDE 20 MEQ/1
40 TABLET, EXTENDED RELEASE ORAL ONCE
Status: COMPLETED | OUTPATIENT
Start: 2018-11-13 | End: 2018-11-13

## 2018-11-13 RX ADMIN — ZIPRASIDONE MESYLATE 10 MG: 20 INJECTION, POWDER, LYOPHILIZED, FOR SOLUTION INTRAMUSCULAR at 00:12

## 2018-11-13 RX ADMIN — Medication 400 MG: at 03:26

## 2018-11-13 RX ADMIN — POTASSIUM CHLORIDE 40 MEQ: 20 TABLET, EXTENDED RELEASE ORAL at 03:26

## 2018-11-13 NOTE — PROGRESS NOTES
[facility]  ED    Patient: Albertina Welch  VUO:[id number]  : 1955  Date of Evaluation: 2018  ED BHUMIKA Provider: PALMIRA Sarkar    Chief Complaint       Chief Complaint   Patient presents with    Panic Attack     squad was called for panic attack. Patient was shaking and couldn't catch breath. EMT coached patient with breathing. patient was standing outside in rain when EMT's arrived     Dion Echeverria is a 61 y.o. with past medical history significant for peritoneal cancer currently undergoing chemotherapy with Dr. Mark Paez now presents for evaluation of panic attack versus mild altered mental status patient appears to be quite agitated her sister who is her caregiver andher baseline mental status well states that this is an acute change for this patient states that she is very agitated and shaking uncontrollably no history of seizure disorder no history of other focal neurological abnormalities or deficits patient is also diaphoretic on exam no recent history of known fever chills nausea or vomiting   She presents for probable agitation and panic attack in the setting of known history of peritoneal cancer, undergoing chemotherapy with her oncologist as described above without other radiation aggravating or alleviating factors times one day    ROS:     Review of Systems agitation setting of known history of cancer as above  At least 10 systemsreviewed and otherwise acutely negative except as in the [address]     Past History     Past Medical History:   Diagnosis Date    Anemia     Arthritis     CAD (coronary artery disease) 2016    BMS-dLAD    Cancer [facility])     peritoneal, pelvis, lymph nodes    Cancer related pain 2015    Chemotherapy-induced neuropathy ([address] 2015    CHF (congestive heart failure) ([address]     History of endometriosis     History of ileus     HTN (hypertension)     Hx of blood clots 2014    lung    Hyperlipidemia     Pulmonary embolism [facility])      Past Surgical History:   Procedure Laterality Date    ABDOMINAL EXPLORATION SURGERY  4/24/2014    Exploratory laparotomy, lysis of adhesions, vaginal cuff repair and biopsy    ABDOMINAL EXPLORATION SURGERY N/A 4/23/2015    Spleenectomy, appendectomy, choleystectomy, omentectomy, sigmoidectomy, cryoreduction    ABDOMINAL EXPLORATION SURGERY  2/19/2016    gastric volvulus repair    CORONARY ANGIOPLASTY WITH STENT PLACEMENT  11/27/2016    BMS- dLAD 2.0x12    FINE NEEDLE ASPIRATION Right 1999    right breast cyst    HYSTERECTOMY      ERICA AND BSO  2002    VAGINAL PROLAPSE REPAIR      WISDOM TOOTH EXTRACTION       Social History     Social History    Marital status: Single     Spouse name: N/A    Number of children: 0    Years of education: N/A     Occupational History          Social History Main Topics    Smoking status: Never Smoker    Smokeless tobacco: Never Used    Alcohol use 0.0 oz/week      Comment: rare    Drug use: No    Sexual activity: Not Asked     Other Topics Concern    None     Social History Narrative    None       Medications/Allergies     Previous Medications    ACETAMINOPHEN (TYLENOL) 325 MG TABLET    Take 650 mg by mouth every 6 hours as needed for Pain (as needed). ASCORBIC ACID (VITAMIN C) 500 MG TABLET    Take 1,000 mg by mouth daily    ASPIRIN 81 MG CHEWABLE TABLET    Take 81 mg by mouth daily    ATORVASTATIN (LIPITOR) 80 MG TABLET    TAKE 1 TABLET BY MOUTH  NIGHTLY    BISACODYL (DULCOLAX PO)    Take 1 tablet by mouth 2 times daily     CYANOCOBALAMIN (VITAMIN B12 PO)    Take by mouth    DIPHENOXYLATE-ATROPINE (LOMOTIL) 2.5-0.025 MG PER TABLET    Take 1 tablet by mouth 4 times daily as needed for Diarrhea. PRN.     DULOXETINE (CYMBALTA) 30 MG EXTENDED RELEASE CAPSULE    Take 30 mg by mouth daily    FLUTICASONE (FLONASE) 50 MCG/ACT NASAL SPRAY    1 spray by Nasal route daily as needed for Allergies     FOLIC ACID

## 2018-12-11 ENCOUNTER — HOSPITAL ENCOUNTER (INPATIENT)
Age: 63
LOS: 2 days | Discharge: HOSPICE | DRG: 375 | End: 2018-12-13
Attending: EMERGENCY MEDICINE | Admitting: INTERNAL MEDICINE
Payer: COMMERCIAL

## 2018-12-11 ENCOUNTER — APPOINTMENT (OUTPATIENT)
Dept: CT IMAGING | Age: 63
DRG: 375 | End: 2018-12-11
Payer: COMMERCIAL

## 2018-12-11 DIAGNOSIS — K56.690 OTHER PARTIAL INTESTINAL OBSTRUCTION (HCC): Primary | ICD-10-CM

## 2018-12-11 DIAGNOSIS — Z85.89 HISTORY OF CANCER OF PERITONEUM: ICD-10-CM

## 2018-12-11 DIAGNOSIS — R18.0 MALIGNANT ASCITES: ICD-10-CM

## 2018-12-11 DIAGNOSIS — E87.1 HYPONATREMIA: ICD-10-CM

## 2018-12-11 PROBLEM — I95.9 HYPOTENSION: Status: ACTIVE | Noted: 2018-12-11

## 2018-12-11 LAB
A/G RATIO: 0.6 (ref 1.1–2.2)
ALBUMIN SERPL-MCNC: 2.4 G/DL (ref 3.4–5)
ALP BLD-CCNC: 178 U/L (ref 40–129)
ALT SERPL-CCNC: 8 U/L (ref 10–40)
ANION GAP SERPL CALCULATED.3IONS-SCNC: 14 MMOL/L (ref 3–16)
AST SERPL-CCNC: 21 U/L (ref 15–37)
BACTERIA: ABNORMAL /HPF
BASOPHILS ABSOLUTE: 0.1 K/UL (ref 0–0.2)
BASOPHILS RELATIVE PERCENT: 1.9 %
BILIRUB SERPL-MCNC: 0.6 MG/DL (ref 0–1)
BILIRUBIN URINE: NEGATIVE
BLOOD, URINE: ABNORMAL
BUN BLDV-MCNC: 31 MG/DL (ref 7–20)
CALCIUM SERPL-MCNC: 8.6 MG/DL (ref 8.3–10.6)
CHLORIDE BLD-SCNC: 82 MMOL/L (ref 99–110)
CLARITY: CLEAR
CO2: 31 MMOL/L (ref 21–32)
COLOR: YELLOW
CREAT SERPL-MCNC: 1.1 MG/DL (ref 0.6–1.2)
EOSINOPHILS ABSOLUTE: 0 K/UL (ref 0–0.6)
EOSINOPHILS RELATIVE PERCENT: 0.2 %
GFR AFRICAN AMERICAN: >60
GFR NON-AFRICAN AMERICAN: 50
GLOBULIN: 4 G/DL
GLUCOSE BLD-MCNC: 111 MG/DL (ref 70–99)
GLUCOSE URINE: NEGATIVE MG/DL
HCT VFR BLD CALC: 26.9 % (ref 36–48)
HEMOGLOBIN: 9 G/DL (ref 12–16)
KETONES, URINE: NEGATIVE MG/DL
LEUKOCYTE ESTERASE, URINE: NEGATIVE
LYMPHOCYTES ABSOLUTE: 0.1 K/UL (ref 1–5.1)
LYMPHOCYTES RELATIVE PERCENT: 0.9 %
MCH RBC QN AUTO: 34.2 PG (ref 26–34)
MCHC RBC AUTO-ENTMCNC: 33.4 G/DL (ref 31–36)
MCV RBC AUTO: 102.5 FL (ref 80–100)
MICROSCOPIC EXAMINATION: YES
MONOCYTES ABSOLUTE: 0.7 K/UL (ref 0–1.3)
MONOCYTES RELATIVE PERCENT: 10.3 %
NEUTROPHILS ABSOLUTE: 5.9 K/UL (ref 1.7–7.7)
NEUTROPHILS RELATIVE PERCENT: 86.7 %
NITRITE, URINE: NEGATIVE
PDW BLD-RTO: 21 % (ref 12.4–15.4)
PH UA: 5.5
PLATELET # BLD: 452 K/UL (ref 135–450)
PMV BLD AUTO: 11 FL (ref 5–10.5)
POTASSIUM SERPL-SCNC: 3.6 MMOL/L (ref 3.5–5.1)
PROTEIN UA: NEGATIVE MG/DL
RBC # BLD: 2.63 M/UL (ref 4–5.2)
RBC UA: ABNORMAL /HPF (ref 0–2)
SODIUM BLD-SCNC: 127 MMOL/L (ref 136–145)
SPECIFIC GRAVITY UA: <=1.005
TOTAL PROTEIN: 6.4 G/DL (ref 6.4–8.2)
URINE REFLEX TO CULTURE: ABNORMAL
URINE TYPE: ABNORMAL
UROBILINOGEN, URINE: 0.2 E.U./DL
WBC # BLD: 6.8 K/UL (ref 4–11)
WBC UA: ABNORMAL /HPF (ref 0–5)

## 2018-12-11 PROCEDURE — 87040 BLOOD CULTURE FOR BACTERIA: CPT

## 2018-12-11 PROCEDURE — 99291 CRITICAL CARE FIRST HOUR: CPT

## 2018-12-11 PROCEDURE — 6370000000 HC RX 637 (ALT 250 FOR IP)

## 2018-12-11 PROCEDURE — 80053 COMPREHEN METABOLIC PANEL: CPT

## 2018-12-11 PROCEDURE — 6360000004 HC RX CONTRAST MEDICATION: Performed by: NURSE PRACTITIONER

## 2018-12-11 PROCEDURE — 2580000003 HC RX 258: Performed by: INTERNAL MEDICINE

## 2018-12-11 PROCEDURE — 85025 COMPLETE CBC W/AUTO DIFF WBC: CPT

## 2018-12-11 PROCEDURE — 1200000000 HC SEMI PRIVATE

## 2018-12-11 PROCEDURE — 74177 CT ABD & PELVIS W/CONTRAST: CPT

## 2018-12-11 PROCEDURE — 2580000003 HC RX 258: Performed by: NURSE PRACTITIONER

## 2018-12-11 PROCEDURE — 81001 URINALYSIS AUTO W/SCOPE: CPT

## 2018-12-11 RX ORDER — DULOXETIN HYDROCHLORIDE 30 MG/1
30 CAPSULE, DELAYED RELEASE ORAL DAILY
Status: DISCONTINUED | OUTPATIENT
Start: 2018-12-12 | End: 2018-12-12

## 2018-12-11 RX ORDER — SODIUM CHLORIDE 9 MG/ML
INJECTION, SOLUTION INTRAVENOUS CONTINUOUS
Status: DISCONTINUED | OUTPATIENT
Start: 2018-12-11 | End: 2018-12-13

## 2018-12-11 RX ORDER — PANTOPRAZOLE SODIUM 40 MG/1
40 TABLET, DELAYED RELEASE ORAL DAILY
Status: DISCONTINUED | OUTPATIENT
Start: 2018-12-12 | End: 2018-12-12

## 2018-12-11 RX ORDER — ASPIRIN 81 MG/1
81 TABLET, CHEWABLE ORAL DAILY
Status: DISCONTINUED | OUTPATIENT
Start: 2018-12-12 | End: 2018-12-12

## 2018-12-11 RX ORDER — SODIUM CHLORIDE 9 MG/ML
INJECTION, SOLUTION INTRAVENOUS
Status: DISPENSED
Start: 2018-12-11 | End: 2018-12-12

## 2018-12-11 RX ORDER — ONDANSETRON 2 MG/ML
4 INJECTION INTRAMUSCULAR; INTRAVENOUS EVERY 6 HOURS PRN
Status: DISCONTINUED | OUTPATIENT
Start: 2018-12-11 | End: 2018-12-13 | Stop reason: HOSPADM

## 2018-12-11 RX ORDER — 0.9 % SODIUM CHLORIDE 0.9 %
500 INTRAVENOUS SOLUTION INTRAVENOUS ONCE
Status: COMPLETED | OUTPATIENT
Start: 2018-12-11 | End: 2018-12-11

## 2018-12-11 RX ORDER — SODIUM CHLORIDE 0.9 % (FLUSH) 0.9 %
10 SYRINGE (ML) INJECTION PRN
Status: DISCONTINUED | OUTPATIENT
Start: 2018-12-11 | End: 2018-12-13 | Stop reason: HOSPADM

## 2018-12-11 RX ORDER — SODIUM CHLORIDE 0.9 % (FLUSH) 0.9 %
10 SYRINGE (ML) INJECTION EVERY 12 HOURS SCHEDULED
Status: DISCONTINUED | OUTPATIENT
Start: 2018-12-11 | End: 2018-12-13 | Stop reason: HOSPADM

## 2018-12-11 RX ORDER — ACETAMINOPHEN 325 MG/1
650 TABLET ORAL EVERY 6 HOURS PRN
Status: DISCONTINUED | OUTPATIENT
Start: 2018-12-11 | End: 2018-12-13 | Stop reason: HOSPADM

## 2018-12-11 RX ADMIN — SODIUM CHLORIDE: 9 INJECTION, SOLUTION INTRAVENOUS at 23:27

## 2018-12-11 RX ADMIN — IOPAMIDOL 75 ML: 755 INJECTION, SOLUTION INTRAVENOUS at 20:08

## 2018-12-11 RX ADMIN — SODIUM CHLORIDE 500 ML: 9 INJECTION, SOLUTION INTRAVENOUS at 21:50

## 2018-12-11 RX ADMIN — Medication: at 23:27

## 2018-12-11 ASSESSMENT — ENCOUNTER SYMPTOMS
NAUSEA: 0
BACK PAIN: 0
COLOR CHANGE: 0
SHORTNESS OF BREATH: 0
WHEEZING: 0
VOMITING: 0
ABDOMINAL PAIN: 1
DIARRHEA: 0
COUGH: 0

## 2018-12-11 ASSESSMENT — PAIN SCALES - GENERAL
PAINLEVEL_OUTOF10: 2
PAINLEVEL_OUTOF10: 0

## 2018-12-11 ASSESSMENT — PAIN DESCRIPTION - PAIN TYPE: TYPE: ACUTE PAIN

## 2018-12-11 ASSESSMENT — PAIN DESCRIPTION - LOCATION: LOCATION: ABDOMEN

## 2018-12-11 NOTE — ED PROVIDER NOTES
presents emergency Department with abdominal discomfort, patient has peritoneal cancer and has to have paracentesis periodically however she recently had a wax catheter placed on December 3, 2018, however her nurse's having a difficult time getting the catheter to drain today, she had sudden onset of severe abdominal pain which since arriving to the ER has since resolved. After evaluation and examination the patient I ordered IV access, blood work, urinalysis and CT scan of the abdomen. Patient was offered pain medicine however she declined stating that her pain is under control right now. Urinalysis shows no acute infection. CBC shows chronic anemia most likely related to her cancer as her H&H is 9 and 26.9. Metabolic panel shows hyponatremia with a sodium of 127 and hypochloremia, patient's blood pressure did drop with a systolic of 86, because of her risk of ascites and fluid retention I ordered her 500 ML bolus. The CT of the abdomen shows metastatic disease throughout the abdomen and pelvis with liver lesions that are increasing in size. In addition she has enlarged lymph nodes in the abdomen and pelvis which is most likely to her known peritoneal cancer. She also has a partial low grade colon obstruction possibly due to invasion of the sigmoid colon by a pelvic lesion. But no small bowel structures suspected. She does have consistent malignant large ascites of the abdomen. However because the patient's blood pressure decreasing, and abnormal metabolic panel with foot appears to be worsening metastatic disease I spoke with the attending physician and we agreed to admit the patient to the hospital.  Sofor admission. At 2150 I spoke with Dr. Julio C Nelson, supposed on-call, we discussed the patient's case at length and he agreed with the plan of care for admission however we also agreed to consult oncology.  At [address] [employer] I spoke with Dr. Jose Metz, oncologist on-call, I informed him that the patient would be admitted

## 2018-12-12 ENCOUNTER — APPOINTMENT (OUTPATIENT)
Dept: INTERVENTIONAL RADIOLOGY/VASCULAR | Age: 63
DRG: 375 | End: 2018-12-12
Payer: COMMERCIAL

## 2018-12-12 ENCOUNTER — APPOINTMENT (OUTPATIENT)
Dept: GENERAL RADIOLOGY | Age: 63
DRG: 375 | End: 2018-12-12
Payer: COMMERCIAL

## 2018-12-12 LAB
A/G RATIO: 0.6 (ref 1.1–2.2)
ALBUMIN SERPL-MCNC: 2.4 G/DL (ref 3.4–5)
ALP BLD-CCNC: 169 U/L (ref 40–129)
ALT SERPL-CCNC: 7 U/L (ref 10–40)
ANION GAP SERPL CALCULATED.3IONS-SCNC: 14 MMOL/L (ref 3–16)
AST SERPL-CCNC: 19 U/L (ref 15–37)
BASOPHILS ABSOLUTE: 0 K/UL (ref 0–0.2)
BASOPHILS RELATIVE PERCENT: 0.4 %
BILIRUB SERPL-MCNC: 0.4 MG/DL (ref 0–1)
BUN BLDV-MCNC: 29 MG/DL (ref 7–20)
CALCIUM SERPL-MCNC: 8.8 MG/DL (ref 8.3–10.6)
CHLORIDE BLD-SCNC: 88 MMOL/L (ref 99–110)
CO2: 30 MMOL/L (ref 21–32)
CREAT SERPL-MCNC: 1.2 MG/DL (ref 0.6–1.2)
EOSINOPHILS ABSOLUTE: 0 K/UL (ref 0–0.6)
EOSINOPHILS RELATIVE PERCENT: 0.6 %
GFR AFRICAN AMERICAN: 55
GFR NON-AFRICAN AMERICAN: 45
GLOBULIN: 4 G/DL
GLUCOSE BLD-MCNC: 125 MG/DL (ref 70–99)
HCT VFR BLD CALC: 25.1 % (ref 36–48)
HEMOGLOBIN: 8.2 G/DL (ref 12–16)
LYMPHOCYTES ABSOLUTE: 0.3 K/UL (ref 1–5.1)
LYMPHOCYTES RELATIVE PERCENT: 4.7 %
MCH RBC QN AUTO: 32.9 PG (ref 26–34)
MCHC RBC AUTO-ENTMCNC: 32.6 G/DL (ref 31–36)
MCV RBC AUTO: 100.9 FL (ref 80–100)
MONOCYTES ABSOLUTE: 0.9 K/UL (ref 0–1.3)
MONOCYTES RELATIVE PERCENT: 12.9 %
NEUTROPHILS ABSOLUTE: 5.9 K/UL (ref 1.7–7.7)
NEUTROPHILS RELATIVE PERCENT: 81.4 %
PDW BLD-RTO: 21 % (ref 12.4–15.4)
PLATELET # BLD: 501 K/UL (ref 135–450)
PMV BLD AUTO: 11.2 FL (ref 5–10.5)
POTASSIUM REFLEX MAGNESIUM: 3.8 MMOL/L (ref 3.5–5.1)
RBC # BLD: 2.49 M/UL (ref 4–5.2)
SODIUM BLD-SCNC: 132 MMOL/L (ref 136–145)
TOTAL PROTEIN: 6.4 G/DL (ref 6.4–8.2)
WBC # BLD: 7.2 K/UL (ref 4–11)

## 2018-12-12 PROCEDURE — 1200000000 HC SEMI PRIVATE

## 2018-12-12 PROCEDURE — 96360 HYDRATION IV INFUSION INIT: CPT

## 2018-12-12 PROCEDURE — 85025 COMPLETE CBC W/AUTO DIFF WBC: CPT

## 2018-12-12 PROCEDURE — 6370000000 HC RX 637 (ALT 250 FOR IP): Performed by: INTERNAL MEDICINE

## 2018-12-12 PROCEDURE — 80053 COMPREHEN METABOLIC PANEL: CPT

## 2018-12-12 PROCEDURE — 6360000002 HC RX W HCPCS: Performed by: RADIOLOGY

## 2018-12-12 PROCEDURE — 76000 FLUOROSCOPY <1 HR PHYS/QHP: CPT

## 2018-12-12 PROCEDURE — 71045 X-RAY EXAM CHEST 1 VIEW: CPT

## 2018-12-12 PROCEDURE — 36591 DRAW BLOOD OFF VENOUS DEVICE: CPT

## 2018-12-12 PROCEDURE — C1729 CATH, DRAINAGE: HCPCS

## 2018-12-12 PROCEDURE — 2580000003 HC RX 258: Performed by: INTERNAL MEDICINE

## 2018-12-12 PROCEDURE — 6360000002 HC RX W HCPCS: Performed by: INTERNAL MEDICINE

## 2018-12-12 PROCEDURE — 96361 HYDRATE IV INFUSION ADD-ON: CPT

## 2018-12-12 RX ORDER — DULOXETIN HYDROCHLORIDE 60 MG/1
60 CAPSULE, DELAYED RELEASE ORAL DAILY
Status: DISCONTINUED | OUTPATIENT
Start: 2018-12-13 | End: 2018-12-13 | Stop reason: HOSPADM

## 2018-12-12 RX ORDER — TRAZODONE HYDROCHLORIDE 50 MG/1
100 TABLET ORAL DAILY
Status: DISCONTINUED | OUTPATIENT
Start: 2018-12-12 | End: 2018-12-13 | Stop reason: HOSPADM

## 2018-12-12 RX ORDER — LORAZEPAM 0.5 MG/1
0.5 TABLET ORAL EVERY 6 HOURS PRN
Status: DISCONTINUED | OUTPATIENT
Start: 2018-12-12 | End: 2018-12-13 | Stop reason: HOSPADM

## 2018-12-12 RX ORDER — FENTANYL 50 UG/H
1 PATCH TRANSDERMAL
Status: DISCONTINUED | OUTPATIENT
Start: 2018-12-12 | End: 2018-12-13 | Stop reason: HOSPADM

## 2018-12-12 RX ORDER — DULOXETIN HYDROCHLORIDE 60 MG/1
60 CAPSULE, DELAYED RELEASE ORAL DAILY
COMMUNITY

## 2018-12-12 RX ORDER — TRAZODONE HYDROCHLORIDE 100 MG/1
100 TABLET ORAL NIGHTLY
COMMUNITY

## 2018-12-12 RX ORDER — FENTANYL 50 UG/H
1 PATCH TRANSDERMAL
COMMUNITY

## 2018-12-12 RX ORDER — LORAZEPAM 2 MG/ML
0.5 INJECTION INTRAMUSCULAR EVERY 10 MIN PRN
Status: DISCONTINUED | OUTPATIENT
Start: 2018-12-12 | End: 2018-12-13 | Stop reason: HOSPADM

## 2018-12-12 RX ORDER — HYDROMORPHONE HCL 110MG/55ML
0.5 PATIENT CONTROLLED ANALGESIA SYRINGE INTRAVENOUS
Status: DISCONTINUED | OUTPATIENT
Start: 2018-12-12 | End: 2018-12-13 | Stop reason: HOSPADM

## 2018-12-12 RX ADMIN — LORAZEPAM 0.5 MG: 2 INJECTION INTRAMUSCULAR; INTRAVENOUS at 10:17

## 2018-12-12 RX ADMIN — SODIUM CHLORIDE, PRESERVATIVE FREE 10 ML: 5 INJECTION INTRAVENOUS at 10:17

## 2018-12-12 RX ADMIN — LORAZEPAM 0.5 MG: 0.5 TABLET ORAL at 08:07

## 2018-12-12 RX ADMIN — ENOXAPARIN SODIUM 40 MG: 40 INJECTION SUBCUTANEOUS at 11:41

## 2018-12-12 RX ADMIN — HYDROMORPHONE HYDROCHLORIDE 0.5 MG: 2 INJECTION, SOLUTION INTRAMUSCULAR; INTRAVENOUS; SUBCUTANEOUS at 10:02

## 2018-12-12 RX ADMIN — ALTEPLASE 10 MG: 2.2 INJECTION, POWDER, LYOPHILIZED, FOR SOLUTION INTRAVENOUS at 14:00

## 2018-12-12 RX ADMIN — SODIUM CHLORIDE, PRESERVATIVE FREE 10 ML: 5 INJECTION INTRAVENOUS at 10:02

## 2018-12-12 RX ADMIN — TRAZODONE HYDROCHLORIDE 100 MG: 50 TABLET ORAL at 11:41

## 2018-12-12 RX ADMIN — SODIUM CHLORIDE: 9 INJECTION, SOLUTION INTRAVENOUS at 18:44

## 2018-12-12 ASSESSMENT — PAIN SCALES - GENERAL
PAINLEVEL_OUTOF10: 5
PAINLEVEL_OUTOF10: 4
PAINLEVEL_OUTOF10: 0
PAINLEVEL_OUTOF10: 7

## 2018-12-12 ASSESSMENT — PAIN DESCRIPTION - PAIN TYPE: TYPE: ACUTE PAIN

## 2018-12-12 ASSESSMENT — PAIN DESCRIPTION - LOCATION: LOCATION: ABDOMEN

## 2018-12-12 NOTE — H&P
adjust if this is not successful  Consider hospice palliation      Primary peritoneal cancer  Palliative care consult  Pain control  Prognosis is very poor      Hypotension-self-limited. Hold blood pressure medications for now. Gentle IV fluids normal saline at 75 ML's per hour    Hyponatremia-asymptomatic mild gentle fluid correction. May be related to volume depletion although could also be SIADH from underlying malignancy    Coronary artery disease-no anginal symptoms. Patient has hypotension which is limiting initiation of anti-anginal right now  Okay to continue on aspirin          DVT Prophylaxis: SCD  Diet: DIET LOW SODIUM 2 GM;  Code Status: Full Code    Dispo - 3-4 d       Solomon Corley MD    Thank you Perico Desai for the opportunity to be involved in this patient's care. If you have any questions or concerns please feel free to contact me at [phone].

## 2018-12-12 NOTE — PROGRESS NOTES
TPA instillation unsuccessful  Fluid very thick and nothing coming out of tube    Will hold on further TPA for now  May need adjustment of drain in am. Will d/w [facility] in am  Dilaudid iv for pain  Palliative care consult placed.     Electronically signed by Petar Pichardo MD on 12/12/2018 at 12:20 AM

## 2018-12-12 NOTE — PROGRESS NOTES
Hospitalist Progress Note      PCP: Comfort Feng    Date of Admission: 12/11/2018    Chief Complaint: abd pain    Hospital Course: \"61 y.o. female who presented to [facility] with with increasing abdominal pain. Patient has has abdominal drain for recurring malignant ascites. Patient has primary peritoneal cancer which has failed chemotherapy. She has a palliative drainage system and has had decreased output over the last several days. In fact she had no drainage from the catheter today as reported by home health care nurse. She had increasing abdominal pain and contacted her oncologist Dr. Gurjit Jose from [facility] who advised the patient to come in to the hospital for evaluation. \"       Subjective:  She is terribly anxious this AM.  She is hyperventilating, rocking back and forth in her chair, wide-eyed scared look on her face. She tells me that she is not short of breath, denies pain or nausea. She isn't sure why she is so anxious. She said she had an \"attack\" like this last night as well. Vitals are stable. Medications:  Reviewed    Infusion Medications    sodium chloride 50 mL/hr at 12/12/18 0037     Scheduled Medications    aspirin  81 mg Oral Daily    DULoxetine  30 mg Oral Daily    pantoprazole  40 mg Oral Daily    sodium chloride flush  10 mL Intravenous 2 times per day    enoxaparin  40 mg Subcutaneous Daily     PRN Meds: HYDROmorphone, LORazepam, acetaminophen, sodium chloride flush, magnesium hydroxide, ondansetron      Intake/Output Summary (Last 24 hours) at 12/12/18 0751  Last data filed at 12/12/18 0351   Gross per 24 hour   Intake              700 ml   Output                0 ml   Net              700 ml       Physical Exam Performed:    BP (!) 93/55   Pulse 67   Temp 98.1 °F (36.7 °C) (Oral)   Resp 16   Ht 5' 3\" (1.6 m)   Wt 119 lb (54 kg)   SpO2 98%   BMI 21.08 kg/m²     General appearance: No apparent distress, appears stated age and cooperative.   HEENT: indicated    Dispo - perhaps medically ready 12/13 if the ascites is draining again. She lives alone.        Swetha Camarillo MD

## 2018-12-12 NOTE — CONSULTS
Hematology/Oncology Consultation         Patient:   Verenice Aleman       Reason for Consult:  Worsening primary peritoneal cancer, failed all therapies, needs hospice   Requesting Physician: No ref. provider found    Chief Complaint:     Chief Complaint   Patient presents with    Abdominal Pain     Had Pleurx drained today by home health nurse and only got 50ml of thick red fluid out. Called Oncology office and was sent for evaluation. History Obtained from:     patient, electronic medical record    History of Present Illness:     Verenice Aleman is a 61 y.o. female  with significant past medical history of stage IV primary peritoneal disase who presents with malfunctioning pleurx drain and failure to thrive. She was recently taken off PARP inhibitor Lynpara due to progression of diease. Patient's pain is well controlled at present, rating it 1/10. Patient is agreeable to speaking with hospice today. She is now incontinent of stool.      Past Medical History:         Diagnosis Date    Anemia     Arthritis     CAD (coronary artery disease) 11/2016    BMS-dLAD    Cancer [facility])     peritoneal, pelvis, lymph nodes    Cancer related pain 11/9/2015    Chemotherapy-induced neuropathy ([address] 8/31/2015    CHF (congestive heart failure) ([address]     History of endometriosis     History of ileus     HTN (hypertension)     Hx of blood clots 2014    lung    Hyperlipidemia     Pulmonary embolism ([address]        Past Surgical History:         Procedure Laterality Date    ABDOMINAL EXPLORATION SURGERY  4/24/2014    Exploratory laparotomy, lysis of adhesions, vaginal cuff repair and biopsy    ABDOMINAL EXPLORATION SURGERY N/A 4/23/2015    Spleenectomy, appendectomy, choleystectomy, omentectomy, sigmoidectomy, cryoreduction    ABDOMINAL EXPLORATION SURGERY  2/19/2016    gastric volvulus repair    CORONARY ANGIOPLASTY WITH STENT PLACEMENT  11/27/2016    BMS- dLAD 2.0x12    FINE NEEDLE ASPIRATION

## 2018-12-12 NOTE — PROGRESS NOTES
IR NOTE:    Unclogged PleurX RLQ with wire, sterile saline irrigation and CathFlow. Large amounts of thick cellular clumps, debris and clots/fibrin. 6 cc of CathFlow left for dwell in catheter, can be removed anytime after 20-30 minutes. I suspect this will re occlude in short order, and strongly doubt that an exchange for a new catheter will make a difference given the abundant debris within the ascites.     Thank you,  Rosamaria Valentine MD  IR

## 2018-12-12 NOTE — PROGRESS NOTES
PT. States that she is having a panic attack. Gave 0.5 Ativan, Po, as ordered. Pt has incontinent episode. Pt. Feels like she is crawling with bugs type of thing.

## 2018-12-13 VITALS
TEMPERATURE: 98.7 F | RESPIRATION RATE: 16 BRPM | HEIGHT: 63 IN | SYSTOLIC BLOOD PRESSURE: 100 MMHG | WEIGHT: 119 LBS | HEART RATE: 97 BPM | DIASTOLIC BLOOD PRESSURE: 68 MMHG | BODY MASS INDEX: 21.09 KG/M2 | OXYGEN SATURATION: 97 %

## 2018-12-13 LAB
ANION GAP SERPL CALCULATED.3IONS-SCNC: 9 MMOL/L (ref 3–16)
BUN BLDV-MCNC: 23 MG/DL (ref 7–20)
CALCIUM SERPL-MCNC: 8.3 MG/DL (ref 8.3–10.6)
CHLORIDE BLD-SCNC: 93 MMOL/L (ref 99–110)
CO2: 32 MMOL/L (ref 21–32)
CREAT SERPL-MCNC: 1 MG/DL (ref 0.6–1.2)
GFR AFRICAN AMERICAN: >60
GFR NON-AFRICAN AMERICAN: 56
GLUCOSE BLD-MCNC: 92 MG/DL (ref 70–99)
HCT VFR BLD CALC: 25.2 % (ref 36–48)
HEMOGLOBIN: 8.3 G/DL (ref 12–16)
MCH RBC QN AUTO: 33.4 PG (ref 26–34)
MCHC RBC AUTO-ENTMCNC: 33.1 G/DL (ref 31–36)
MCV RBC AUTO: 101 FL (ref 80–100)
PDW BLD-RTO: 21.6 % (ref 12.4–15.4)
PLATELET # BLD: 530 K/UL (ref 135–450)
PMV BLD AUTO: 11 FL (ref 5–10.5)
POTASSIUM REFLEX MAGNESIUM: 3.7 MMOL/L (ref 3.5–5.1)
RBC # BLD: 2.5 M/UL (ref 4–5.2)
SODIUM BLD-SCNC: 134 MMOL/L (ref 136–145)
WBC # BLD: 10.5 K/UL (ref 4–11)

## 2018-12-13 PROCEDURE — 85027 COMPLETE CBC AUTOMATED: CPT

## 2018-12-13 PROCEDURE — 80048 BASIC METABOLIC PNL TOTAL CA: CPT

## 2018-12-13 PROCEDURE — 2580000003 HC RX 258: Performed by: INTERNAL MEDICINE

## 2018-12-13 PROCEDURE — 36591 DRAW BLOOD OFF VENOUS DEVICE: CPT

## 2018-12-13 PROCEDURE — 6360000002 HC RX W HCPCS: Performed by: INTERNAL MEDICINE

## 2018-12-13 PROCEDURE — 6370000000 HC RX 637 (ALT 250 FOR IP): Performed by: INTERNAL MEDICINE

## 2018-12-13 RX ORDER — MORPHINE SULFATE 100 MG/5ML
5 SOLUTION ORAL
Qty: 30 ML | Refills: 0 | Status: SHIPPED | OUTPATIENT
Start: 2018-12-13 | End: 2018-12-23

## 2018-12-13 RX ORDER — LORAZEPAM 2 MG/ML
1 CONCENTRATE ORAL
Qty: 30 ML | Refills: 0 | Status: SHIPPED | OUTPATIENT
Start: 2018-12-13 | End: 2018-12-27

## 2018-12-13 RX ADMIN — LORAZEPAM 0.5 MG: 0.5 TABLET ORAL at 15:08

## 2018-12-13 RX ADMIN — DULOXETINE HYDROCHLORIDE 60 MG: 60 CAPSULE, DELAYED RELEASE ORAL at 09:03

## 2018-12-13 RX ADMIN — HYDROMORPHONE HYDROCHLORIDE 0.5 MG: 2 INJECTION, SOLUTION INTRAMUSCULAR; INTRAVENOUS; SUBCUTANEOUS at 12:13

## 2018-12-13 RX ADMIN — ENOXAPARIN SODIUM 40 MG: 40 INJECTION SUBCUTANEOUS at 09:03

## 2018-12-13 RX ADMIN — SODIUM CHLORIDE: 9 INJECTION, SOLUTION INTRAVENOUS at 02:05

## 2018-12-13 RX ADMIN — HYDROMORPHONE HYDROCHLORIDE 0.5 MG: 2 INJECTION, SOLUTION INTRAMUSCULAR; INTRAVENOUS; SUBCUTANEOUS at 02:05

## 2018-12-13 RX ADMIN — SODIUM CHLORIDE, PRESERVATIVE FREE 10 ML: 5 INJECTION INTRAVENOUS at 09:03

## 2018-12-13 RX ADMIN — TRAZODONE HYDROCHLORIDE 100 MG: 50 TABLET ORAL at 09:03

## 2018-12-13 ASSESSMENT — PAIN DESCRIPTION - LOCATION
LOCATION: ABDOMEN
LOCATION: ABDOMEN

## 2018-12-13 ASSESSMENT — PAIN DESCRIPTION - PAIN TYPE
TYPE: ACUTE PAIN
TYPE: ACUTE PAIN

## 2018-12-13 ASSESSMENT — PAIN DESCRIPTION - ORIENTATION
ORIENTATION: RIGHT
ORIENTATION: RIGHT

## 2018-12-13 ASSESSMENT — PAIN SCALES - GENERAL
PAINLEVEL_OUTOF10: 4
PAINLEVEL_OUTOF10: 8
PAINLEVEL_OUTOF10: 4
PAINLEVEL_OUTOF10: 6
PAINLEVEL_OUTOF10: 0

## 2018-12-13 ASSESSMENT — PAIN DESCRIPTION - FREQUENCY
FREQUENCY: CONTINUOUS
FREQUENCY: CONTINUOUS

## 2018-12-13 ASSESSMENT — PAIN DESCRIPTION - DESCRIPTORS
DESCRIPTORS: DISCOMFORT
DESCRIPTORS: DISCOMFORT

## 2018-12-13 NOTE — PLAN OF CARE
Palliative Care Progress Note  Palliative Care Admit date:  12/12/18    Plan of care/goals:  Pt sitting up in chair, on phone and wanted to complete her call. She looks much better compared to yesterday's visit. She is calm, having a conversation and appears comfortable w/ eyes open and pleasant affect. Has rec'd prn hydromorphone X1 (0.5mg) in last 24 hr; no additional lorazepam since her two am doses yesterday morning. Spoke w/ 89 [address] [facility] who anticipate home hospice when physician ready for d/c.    Plan:  [address]50 [address] planning home hospice, discussing DME and potential eventuality of long term placement need.         Reason for consult:    ___ Advance Care Planning  _X_ Transition of Care Planning  ___ Psychosocial/Spiritual Support  ___ Symptom Management                                            Belen Brown RN
Problem: Falls - Risk of:  Goal: Will remain free from falls  Will remain free from falls   Outcome: Ongoing  Pt remains free from falls. Non skid socks in place. Bed alarm active. Bed locked and in lowest position. Call light and bedside table within reach. Will continue to monitor.
restriction. (Date: 12/07/2018)   Depression Status Was screened; Outcome positive: No; Screening Date: 10/30/2018; Screening Tool: Patient Health Questionnaire (PHQ9)   Psycho-social PHQ-9 Follow-up Plan (if applicable): Not addressed at this visit  Assessment & Plan       Assessment:    1. Possible Pleur-x catheter infection-unable to fully assess due to dressing covering the site  1. 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 However, clinically she is progressing with more ascites. She wants to continue Migel. 2. Lynparza-mucositis has been limiting factor-but is not problematic today. 3. Anticoagulation-ongoing minimal bleeding. 4. History of PE and coronary thrombosis, S/P IVC filter placement March 2018. Plan:    1. Pleurx catheter in place-will go ahead and treat with Keflex. I told Deyvi Blanchard that her home care nurse will need to let us know next week if the site continues to be red. If this is the case, we will have to bring her back in and assess the site. She will need tobring additional supplies with her so we can re-dress the site. She should call foe any fevers or chills. 2. She will keep her regularly scheduled appt with Dr. Estefania Contreras 12/17. Freya Doran Recent imaging and labs were reviewed and discussed with the patient. Patient was instructed to stop at our  to make their next appointment before leaving. They will call in the interim with any questions/concerns/new symptoms. This plan was discussed with the patient and they verbalized understanding and acceptance of the plan. WILLIE Albrecht, OCN       Electronically signed by Merlinda Raman Rosalba Amor MD 12/10/2018 11:17 EST

## 2018-12-13 NOTE — PROGRESS NOTES
PleurX catheter in RLQ of adomen drained per MD order. 225 mL of serosanguinous drainage drained. Hooked up to suction for total of 10 mins. Pt tolerated fairly well. Pt c/o cramping and pain near end of drainage. Pain score of 6/10. Pt medicated with 0.5 mg of Dilaudid IV. Pt left in bed with eyes closed. Bed locked and in lowest position. Call light and patient belongings within reach. Pt sister at bed side. Will continue to monitor.

## 2018-12-13 NOTE — PROGRESS NOTES
Shift assessment completed. Pt A&O, VSS. Pt states she is tired and wants to be left alone. Resting comfortably in bed. Denies any needs at this time. Bed locked and in lowest position. Call light within reach. Will continue to monitor.

## 2018-12-16 LAB
BLOOD CULTURE, ROUTINE: NORMAL
CULTURE, BLOOD 2: NORMAL

## 2019-01-11 NOTE — PROGRESS NOTES
Hyperlipidemia     Pulmonary embolism [facility])      Past Surgical History:   Procedure Laterality Date    ABDOMINAL EXPLORATION SURGERY  4/24/2014    Exploratory laparotomy, lysis of adhesions, vaginal cuff repair and biopsy    ABDOMINAL EXPLORATION SURGERY N/A 4/23/2015    Spleenectomy, appendectomy, choleystectomy, omentectomy, sigmoidectomy, cryoreduction    ABDOMINAL EXPLORATION SURGERY  2/19/2016    gastric volvulus repair    CORONARY ANGIOPLASTY WITH STENT PLACEMENT  11/27/2016    BMS- dLAD 2.0x12    FINE NEEDLE ASPIRATION Right 1999    right breast cyst    HYSTERECTOMY      ERICA AND BSO  2002    VAGINAL PROLAPSE REPAIR      WISDOM TOOTH EXTRACTION       Social History     Social History    Marital status: Single     Spouse name: N/A    Number of children: 0    Years of education: N/A     Occupational History          Social History Main Topics    Smoking status: Never Smoker    Smokeless tobacco: Never Used    Alcohol use 0.0 oz/week      Comment: rare    Drug use: No    Sexual activity: Not Asked     Other Topics Concern    None     Social History Narrative    None       Medications/Allergies     Discharge Medication List as of 11/13/2018  3:37 AM      CONTINUE these medications which have NOT CHANGED    Details   furosemide (LASIX) 20 MG tablet TAKE 1 TABLET BY MOUTH  DAILY AS NEEDED, Disp-90 tablet, R-3Normal      atorvastatin (LIPITOR) 80 MG tablet TAKE 1 TABLET BY MOUTH  NIGHTLY, Disp-90 tablet, R-3Normal      metoprolol tartrate (LOPRESSOR) 25 MG tablet TAKE 1 TABLET BY MOUTH TWO  TIMES DAILY, Disp-180 tablet, R-3Normal      XARELTO 20 MG TABS tablet Take 1 tablet by mouth  daily, Disp-90 tablet, R-3      DULoxetine (CYMBALTA) 30 MG extended release capsule Take 30 mg by mouth daily      traZODone (DESYREL) 50 MG tablet Take 100 mg by mouth nightly       pantoprazole (PROTONIX) 40 MG tablet Take 40 mg by mouth daily      aspirin 81 MG chewable tablet Take 81 mg by mouth dailyHistorical Med      Cyanocobalamin (VITAMIN B12 PO) Take by mouthHistorical Med      folic acid (FOLVITE) 1 MG tablet Take 1 tablet by mouth  daily, Disp-90 tablet, R-0Normal      nitroGLYCERIN (NITROSTAT) 0.4 MG SL tablet up to max of 3 total doses. If no relief after 1 dose, call 911., Disp-25 tablet, R-3Normal      Magic Mouthwash (MIRACLE MOUTHWASH) Swish and spit 5 mLs 4 times daily as needed for IrritationHistorical Med      diphenoxylate-atropine (LOMOTIL) 2.5-0.025 MG per tablet Take 1 tablet by mouth 4 times daily as needed for Diarrhea. PRN. Historical Med      Ascorbic Acid (VITAMIN C) 500 MG tablet Take 1,000 mg by mouth daily      Multiple Vitamins-Minerals (THERAPEUTIC MULTIVITAMIN-MINERALS) tablet Take 1 tablet by mouth daily      pyridoxine (B-6) 100 MG tablet Take 50 mg by mouth 2 times daily      Bisacodyl (DULCOLAX PO) Take 1 tablet by mouth 2 times daily       fluticasone (FLONASE) 50 MCG/ACT nasal spray 1 spray by Nasal route daily as needed for Allergies       acetaminophen (TYLENOL) 325 MG tablet Take 650 mg by mouth every 6 hours as needed for Pain (as needed).            No Known Allergies     Physical Exam       ED Triage Vitals [11/12/18 2201]   BP Temp Temp Source Pulse Resp SpO2 Height Weight   (!) 97/53 97.5 °F (36.4 °C) Axillary 70 22 99 % 5' 5\" (1.651 m) 110 lb (49.9 kg)     Physical Exam  Normotensive non-tachycardic afebrile mildly tachypneic satting well on room air  Constitutional:  Mildly agitated intermittently responsive to questioning appears plain nourished poorly developed in mild distress secondary to agitation  Eyes:  PERRLA, EOMI x6, no nystagmus no photophobia  HENT:  Teeth and tongue intact, uvula midline, no PTA or retropharyngeal abscess noted no other intraoral lesion or edema appreciated patient tolerating secretions well, no stridor, no nuchal rigidity  Respiratory:  Clear to auscultation bilaterally, no crepitus or subcutaneous emphysema noted with palpation in

## 2019-03-01 NOTE — ED PROVIDER NOTES
Hyperlipidemia     Pulmonary embolism [facility])      Past Surgical History:   Procedure Laterality Date    ABDOMINAL EXPLORATION SURGERY  4/24/2014    Exploratory laparotomy, lysis of adhesions, vaginal cuff repair and biopsy    ABDOMINAL EXPLORATION SURGERY N/A 4/23/2015    Spleenectomy, appendectomy, choleystectomy, omentectomy, sigmoidectomy, cryoreduction    ABDOMINAL EXPLORATION SURGERY  2/19/2016    gastric volvulus repair    CORONARY ANGIOPLASTY WITH STENT PLACEMENT  11/27/2016    BMS- dLAD 2.0x12    FINE NEEDLE ASPIRATION Right 1999    right breast cyst    HYSTERECTOMY      ERICA AND BSO  2002    VAGINAL PROLAPSE REPAIR      WISDOM TOOTH EXTRACTION       Social History     Social History    Marital status: Single     Spouse name: N/A    Number of children: 0    Years of education: N/A     Occupational History          Social History Main Topics    Smoking status: Never Smoker    Smokeless tobacco: Never Used    Alcohol use 0.0 oz/week      Comment: rare    Drug use: No    Sexual activity: Not Asked     Other Topics Concern    None     Social History Narrative    None       Medications/Allergies     Discharge Medication List as of 11/13/2018  3:37 AM      CONTINUE these medications which have NOT CHANGED    Details   furosemide (LASIX) 20 MG tablet TAKE 1 TABLET BY MOUTH  DAILY AS NEEDED, Disp-90 tablet, R-3Normal      atorvastatin (LIPITOR) 80 MG tablet TAKE 1 TABLET BY MOUTH  NIGHTLY, Disp-90 tablet, R-3Normal      metoprolol tartrate (LOPRESSOR) 25 MG tablet TAKE 1 TABLET BY MOUTH TWO  TIMES DAILY, Disp-180 tablet, R-3Normal      XARELTO 20 MG TABS tablet Take 1 tablet by mouth  daily, Disp-90 tablet, R-3      DULoxetine (CYMBALTA) 30 MG extended release capsule Take 30 mg by mouth daily      traZODone (DESYREL) 50 MG tablet Take 100 mg by mouth nightly       pantoprazole (PROTONIX) 40 MG tablet Take 40 mg by mouth daily      aspirin 81 MG chewable tablet Take 81 mg by mouth